# Patient Record
Sex: MALE | URBAN - METROPOLITAN AREA
[De-identification: names, ages, dates, MRNs, and addresses within clinical notes are randomized per-mention and may not be internally consistent; named-entity substitution may affect disease eponyms.]

---

## 2018-09-26 ENCOUNTER — PREPPED CHART (OUTPATIENT)
Dept: URBAN - METROPOLITAN AREA CLINIC 26 | Facility: CLINIC | Age: 58
End: 2018-09-26

## 2020-03-22 PROBLEM — M1A.0790 CHRONIC IDIOPATHIC GOUT INVOLVING TOE: Status: ACTIVE | Noted: 2020-03-22

## 2020-08-14 ENCOUNTER — PATIENT OUTREACH (OUTPATIENT)
Dept: OTHER | Facility: OTHER | Age: 60
End: 2020-08-14

## 2020-08-14 NOTE — LETTER
August 14, 2020     Eric Ledesma  [address] [facility] [address]       Dear Mr. Ledesma,    Welcome to [facility]! Our goal is to make care effective, proactive and convenient by using data you send us from home to better treat your chronic conditions.              My name is Tala Alejo, and I am your dedicated Digital Medicine clinician. As an expert in medication management, I will help ensure that the medications you are taking continue to provide the intended benefits and help you reach your goals. You can reach me directly at [phone] or by sending me a message directly through your [facility] account.      I am Eric Myles and I will be your health . My job is to help you identify lifestyle changes to improve your disease control. We will talk about nutrition, exercise, and other ways you may be able to adjust your current habits to better your health. Additionally, we will help ensure you are completing the tests and screenings that are necessary to help manage your conditions. You can reach me directly at [phone] or by sending me a message directly through your [facility] account.    Most importantly, YOU are at the center of this team. Together, we will work to improve your overall health and encourage you to meet your goals for a healthier lifestyle.     What we expect from YOU:  · Please take frequent home blood pressure measurements. We ask that you take at least 1 blood pressure reading per week, but more information will better help us get you know you. Be sure you rest for a few minutes before taking the reading in a quiet, comfortable place.     Be available to receive phone calls or [employer] messages, when appropriate, from your care team. Please let us know if there are any specific days or times that work best for us to reach you via phone.     Complete routine tests and screenings. Dont worry, we will help keep you on track!           What  you should expect from your Digital Medicine Care Team:   We will work with you to create a personalized plan of care and provide you with encouragement and education, including regarding lifestyle changes, that could help you manage your disease states.     We will adjust your current medications, if needed, and continue to monitor your long-term progress.     We will provide you and your physician with monthly progress reports after you have been in the program for more than 30 days.     We will send you reminders through [employer] and text messages to help ensure you do not miss any testing deadlines to help manage your disease states.    You will be able to reach us by phone or through your [facility] account by clicking our names under Care Team on the right side of the home screen.    I look forward to working with you to achieve your blood pressure goals!    We look forward to working with you to help manage your health,    Sincerely,    Your Digital Medicine Team    Please visit our websites to learn more:   · Hypertension: [email or web address]      Remember, we are not available for emergencies. If you have an emergency, please contact your doctors office directly or call [facility] ([phone] or [phone]) or [phone].          EMS

## 2020-08-14 NOTE — PROGRESS NOTES
Digital Medicine: Health  Introduction    Introduced Eric Ledesma to Digital Medicine. Discussed health  role and recommended lifestyle modifications.    The history is provided by the patient.           Additional Enrollment Details: Patient explained that he also uses another cuff to monitor from home and that the other cuff runs about 20 points less than the digital medicine cuff. We reviewed technique and he agreed to see if there is any improvement in the descrepency over the next 4-7 weeks.     He has been taking his readings before taking his blood pressure meds and from a couch - we discussed proper timing and technique.    HYPERTENSION  Explained hypertension digital medicine goals including BP goal less than or equal to 130/80mmHg, improved convenience of BP management and reduced risk of heart attack, kidney failure, stroke, eye disease, dementia, and death.      Explained that we expect patient to submit several blood pressure readings per week at random times of the day, but at least 30 minutes after taking blood pressure medications. Instructed patient not to allow anyone else to use their blood pressure monitor and phone as data submitted is directly entered into medical record. Reviewed and confirmed appropriate blood pressure monitoring technique.         Patient's BP goal is less than or equal to 130/80.Patient's BP average is 134/83 mmHg, which is above goal, per 2017 ACC/AHA Hypertension Guidelines.    Explained to the patient that the Digital Medicine team is not available for emergencies. Advised patient call [facility] ([phone] or [phone]) or [phone] if needed.               Diet-Not assessed          Physical Activity-Not assessed    Medication Adherence-Medication Adherence not addressed.      Substance, Sleep, Stress-Not assessed      Additional monitoring needed.  Reviewed Device Techniques.     Patient verbalizes understanding.      Sent link to [facility] webpages and my contact information via [employer] for future questions.        Explained to the patient that the Digital Medicine team is not available for emergencies. Advised patient call [facility] ([phone] or [phone]) or [phone] if needed.           Topic    Lipid (Cholesterol) Test        Last 5 Patient Entered Readings                                      Current 30 Day Average: 134/83     Recent Readings 8/14/2020 8/13/2020 8/12/2020 8/12/2020 8/11/2020    SBP (mmHg) 131 150 110 151 134    DBP (mmHg) 81 90 70 75 85    Pulse 77 79 87 56 82

## 2020-08-17 ENCOUNTER — PATIENT OUTREACH (OUTPATIENT)
Dept: OTHER | Facility: OTHER | Age: 60
End: 2020-08-17

## 2020-08-17 NOTE — PROGRESS NOTES
Digital Medicine: Clinician Introduction    Eric Ledesma is a 59 y.o. male who is newly enrolled in the Digital Medicine Clinic.    The history is provided by the patient.      Review of patient's allergies indicates:  No Known Allergies  Completed Medication Reconciliation  Verified pharmacy information.    HYPERTENSION  Explained hypertension digital medicine goals including BP goal less than or equal to 130/80mmHg, improved convenience of BP management and reduced risk of heart attack, kidney failure, stroke, eye disease, dementia, and death.     Explained non-pharmacologic therapies like low salt diet and physical activity can reduce blood pressure.       Explained that we expect patient to submit several blood pressure readings per week at random times of the day, but at least 30 minutes after taking blood pressure medications. Instructed patient not to allow anyone else to use their blood pressure monitor and phone as data submitted is directly entered into medical record. Reviewed and confirmed appropriate blood pressure monitoring technique.         Reviewed signs/symptoms of hypertension (headache, changes in vision, chest pain, shortness of breath)   Reviewed signs/symptoms of hypotension (lightheaded, dizziness, weakness)         Last 5 Patient Entered Readings                                      Current 30 Day Average: 133/82     Recent Readings 8/17/2020 8/16/2020 8/15/2020 8/14/2020 8/13/2020    SBP (mmHg) 139 123 131 131 150    DBP (mmHg) 81 75 76 81 90    Pulse 78 75 77 77 79          Depression Screening  Eric Ledesma did not answer the depression screening.     Sleep Apnea Screening  Patient not previously diagnosed with JESSICA       Medication Affordability Screening  Patient screened negative on the medication affordability questionnaires. Patient is currently not having problems affording medications    Medication Adherence-Medication adherence was assessed.  Patient continue taking  medication as prescribed.            ASSESSMENT(S)  Patients BP average is 133/82 mmHg, which is above goal. Patient's BP goal is less than or equal to 130/80 per 2017 ACC/AHA Hypertension Guidelines.     Patient currently taking amlodipine 10 mg tab daily, which is appropriate first line HTN therapy for an  male. Recommend continuation of therapy and reassess in 2-3 months.      Hypertension Plan  Continue current therapy.       Addressed any questions or concerns and patient has my contact information if needed prior to next outreach. Patient verbalizes understanding.      Explained the importance of self-monitoring and medication adherence. Encouraged the patient to communicate with their health  for lifestyle modifications to help improve or maintain a healthy lifestyle.        Sent link to [facility] webpages and my contact information via [employer] for future questions.        Explained to the patient that the Digital Medicine team is not available for emergencies. Advised patient call [facility] ([phone] or [phone]) or [phone] if needed.             Topic    Lipid (Cholesterol) Test        Current Medication Regimen:  Hypertension Medications             amLODIPine (NORVASC) 10 MG tablet Take 1 tablet (10 mg total) by mouth once daily.

## 2020-10-20 ENCOUNTER — PATIENT OUTREACH (OUTPATIENT)
Dept: OTHER | Facility: OTHER | Age: 60
End: 2020-10-20

## 2020-10-20 NOTE — PROGRESS NOTES
Digital Medicine: Clinician Follow-Up    Patient indicates not taking BP medications prior to taking reading. Patient willing to start taking amlodipine 10 mg tab at night starting 10/21 as he has already taken today's dose.    Patient thought Dig Med Program was free and he received a bill in the mail. Declines being transferred to billing department. Indicates the mail he received has the phone number to call on it and he will complete task when he has time.    The history is provided by the patient.   Follow-up reason(s): routine follow up.     Hypertension    Readings are trending up   Patient is not experiencing signs/symptoms of hypotension.  Patient is not experiencing signs/symptoms of hypertension.            Last 5 Patient Entered Readings                                      Current 30 Day Average: 130/83     Recent Readings 10/20/2020 10/19/2020 10/18/2020 10/18/2020 10/17/2020    SBP (mmHg) 138 126 121 144 145    DBP (mmHg) 82 83 81 87 95    Pulse 73 75 65 76 80                 Depression Screening  Did not address depression screening.    Sleep Apnea Screening    Did not address sleep apnea screening.     Medication Affordability Screening  Did not address medication affordability screening.     Medication Adherence-Medication adherence was assessed.          ASSESSMENT(S)  Patients BP average is 130/83 mmHg, which is at goal. Patient's BP goal is less than or equal to 130/80.    Hypertension Plan  Continue current therapy.  Provided patient education. Informed patient to please keep me informed if he would like to remain in the program after speaking with billing department. Counseled patient if challenging to take BP med in the morning and wait an hr before checking BP, he is able to begin taking amlodipine at night starting 10/21.       Addressed patient questions and patient has my contact information if needed prior to next outreach. Patient verbalizes understanding.                 Topic     Lipid (Cholesterol) Test      There are no preventive care reminders to display for this patient.      Hypertension Medications             amLODIPine (NORVASC) 10 MG tablet Take 1 tablet (10 mg total) by mouth once daily.

## 2020-10-28 ENCOUNTER — PATIENT OUTREACH (OUTPATIENT)
Dept: OTHER | Facility: OTHER | Age: 60
End: 2020-10-28

## 2020-10-28 NOTE — PROGRESS NOTES
"Digital Medicine: Health  Follow-Up    The history is provided by the patient.             Reason for review: Blood pressure not at goal        Topics Covered on Call: Diet and med timing    Additional Follow-up details: Mr. Ledesma explained that he had called about the insurance bill that he received and was able to resolve it. He discussed that he still is interested in being enrolled in the program.                Diet-no change to diet    No change to diet.  Patient reports eating or drinking the following: Mr. Ledesma explained that he understands that diet can effect the blood pressure and that he does "the best he can".       Physical Activity-Not assessed    Medication Adherence-Medication adherence was asssessed.    Patient reported missing medication: Mr. Ledesma and I reviewed the proper timing of his blood pressure readings in relation to his medication, which he is working to change..      Substance, Sleep, Stress-Not assessed      Additional monitoring needed.       Addressed patient questions and patient has my contact information if needed prior to next outreach. Patient verbalizes understanding.      Explained the importance of self-monitoring and medication adherence. Encouraged the patient to communicate with their health  for lifestyle modifications to help improve or maintain a healthy lifestyle.                   Topic    Lipid (Cholesterol) Test          Last 5 Patient Entered Readings                                      Current 30 Day Average: 132/84     Recent Readings 10/28/2020 10/28/2020 10/27/2020 10/27/2020 10/27/2020    SBP (mmHg) 137 154 138 149 138    DBP (mmHg) 86 90 84 88 90    Pulse 74 75 72 74 76               "

## 2020-12-09 ENCOUNTER — PATIENT OUTREACH (OUTPATIENT)
Dept: OTHER | Facility: OTHER | Age: 60
End: 2020-12-09

## 2020-12-09 NOTE — PROGRESS NOTES
Digital Medicine: Health  Follow-Up    The history is provided by the patient.             Reason for review: Blood pressure not at goal      Additional Follow-up details: We checked in briefly as it sounded like he may have been working during our conversation.    He explained that he sometimes takes his blood pressure first thing in the morning, after waking, and notices that when he waits longer after it to retake it that his readings are lower. I requested that he wait for at least 45 minutes after waking in the morning to take his readings.    He discussed that he continues to receive bills indicating that digital medicine is billing his insurance. I explained that he is not obligated to pay those bills at this point.             Diet-Not assessed          Physical Activity-Not assessed    Medication Adherence-Medication adherence was asssessed.  Patient continue taking medication as prescribed.          He explained that he takes his blood pressure medicine at night  Substance, Sleep, Stress-Not assessed      Additional monitoring needed.  Reviewed Device Techniques.     Patient verbalizes understanding.      Explained the importance of self-monitoring and medication adherence. Encouraged the patient to communicate with their health  for lifestyle modifications to help improve or maintain a healthy lifestyle.                   Topic    Lipid (Cholesterol) Test     Hemoglobin A1C     Urine Protein Check          Last 5 Patient Entered Readings                                      Current 30 Day Average: 137/82     Recent Readings 12/9/2020 12/8/2020 12/7/2020 12/6/2020 12/5/2020    SBP (mmHg) 129 124 147 137 117    DBP (mmHg) 79 75 84 89 79    Pulse 75 63 71 69 69

## 2021-04-16 ENCOUNTER — PATIENT MESSAGE (OUTPATIENT)
Dept: OTHER | Facility: OTHER | Age: 61
End: 2021-04-16

## 2021-12-14 PROBLEM — F17.210 CIGARETTE SMOKER: Status: ACTIVE | Noted: 2021-12-14

## 2022-06-07 DIAGNOSIS — U07.1 COVID-19 VIRUS DETECTED: ICD-10-CM

## 2022-07-11 PROBLEM — M10.9 GOUT: Status: ACTIVE | Noted: 2022-07-11

## 2022-07-25 ENCOUNTER — CLINICAL SUPPORT (OUTPATIENT)
Dept: SMOKING CESSATION | Facility: CLINIC | Age: 62
End: 2022-07-25
Payer: COMMERCIAL

## 2022-07-25 DIAGNOSIS — F17.200 NICOTINE DEPENDENCE: Primary | ICD-10-CM

## 2022-07-25 PROCEDURE — 99404 PR PREVENT COUNSEL,INDIV,60 MIN: ICD-10-PCS | Mod: S$GLB,,, | Performed by: GENERAL PRACTICE

## 2022-07-25 PROCEDURE — 99404 PREV MED CNSL INDIV APPRX 60: CPT | Mod: S$GLB,,, | Performed by: GENERAL PRACTICE

## 2022-07-25 RX ORDER — DM/P-EPHED/ACETAMINOPH/DOXYLAM 30-7.5/3
2 LIQUID (ML) ORAL
Qty: 96 LOZENGE | Refills: 0 | Status: SHIPPED | OUTPATIENT
Start: 2022-07-25 | End: 2022-11-22 | Stop reason: SDUPTHER

## 2022-07-25 RX ORDER — IBUPROFEN 200 MG
1 TABLET ORAL DAILY
Qty: 14 PATCH | Refills: 0 | Status: SHIPPED | OUTPATIENT
Start: 2022-07-25 | End: 2022-11-22 | Stop reason: DRUGHIGH

## 2022-08-04 ENCOUNTER — CLINICAL SUPPORT (OUTPATIENT)
Dept: SMOKING CESSATION | Facility: CLINIC | Age: 62
End: 2022-08-04
Payer: COMMERCIAL

## 2022-08-04 DIAGNOSIS — F17.200 NICOTINE DEPENDENCE: Primary | ICD-10-CM

## 2022-08-04 PROCEDURE — 99402 PR PREVENT COUNSEL,INDIV,30 MIN: ICD-10-PCS | Mod: S$GLB,,, | Performed by: GENERAL PRACTICE

## 2022-08-04 PROCEDURE — 99402 PREV MED CNSL INDIV APPRX 30: CPT | Mod: S$GLB,,, | Performed by: GENERAL PRACTICE

## 2022-08-08 DIAGNOSIS — F17.200 NICOTINE DEPENDENCE: Primary | ICD-10-CM

## 2022-08-08 RX ORDER — DM/P-EPHED/ACETAMINOPH/DOXYLAM 30-7.5/3
2 LIQUID (ML) ORAL
Qty: 96 LOZENGE | Refills: 0 | Status: SHIPPED | OUTPATIENT
Start: 2022-08-08 | End: 2022-11-22 | Stop reason: SDUPTHER

## 2022-08-08 RX ORDER — NICOTINE 7MG/24HR
1 PATCH, TRANSDERMAL 24 HOURS TRANSDERMAL DAILY
Qty: 14 PATCH | Refills: 0 | Status: SHIPPED | OUTPATIENT
Start: 2022-08-08 | End: 2022-11-22 | Stop reason: DRUGHIGH

## 2022-08-09 NOTE — PROGRESS NOTES
Individual Follow-Up Form    8/9/2022    Quit Date: TBD    Clinical Status of Patient: Outpatient    Length of Service: 30 minutes    Continuing Medication: yes  Patches    Other Medications: lozenges     Target Symptoms: Withdrawal and medication side effects. The following were  rated moderate (3) to severe (4) on TCRS:  · Moderate (3): none  · Severe (4): none    Comments: Followed up with patient by phone. Patient is down to 3-4 cigarettes per day.  rientation, client introductions, completion of TCRS (Tobacco Cessation Rating Scale) learned addiction model, cues/triggers, personal reasons for quitting, medications, goals, quit date.The patient will continue with  therapy sessions and medication monitoring by CTTS. Prescribed medication management will be by physician.The patient denies any abnormal behavioral or mental changes at this time.       Diagnosis: F17.210    Next Visit: 2 weeks

## 2022-11-17 ENCOUNTER — CLINICAL SUPPORT (OUTPATIENT)
Dept: SMOKING CESSATION | Facility: CLINIC | Age: 62
End: 2022-11-17
Payer: COMMERCIAL

## 2022-11-17 DIAGNOSIS — F17.200 NICOTINE DEPENDENCE: Primary | ICD-10-CM

## 2022-11-17 PROCEDURE — 99999 PR PBB SHADOW E&M-EST. PATIENT-LVL I: ICD-10-PCS | Mod: PBBFAC,,,

## 2022-11-17 PROCEDURE — 99999 PR PBB SHADOW E&M-EST. PATIENT-LVL I: CPT | Mod: PBBFAC,,,

## 2022-11-17 PROCEDURE — 99407 PR TOBACCO USE CESSATION INTENSIVE >10 MINUTES: ICD-10-PCS | Mod: S$GLB,,,

## 2022-11-17 PROCEDURE — 99407 BEHAV CHNG SMOKING > 10 MIN: CPT | Mod: S$GLB,,,

## 2022-11-17 NOTE — PROGRESS NOTES
Spoke with patient today in regard to smoking cessation progress for 3 month telephone follow up, he states not tobacco free. Patient has scheduled an appointment to return to the program for Quit #2 on 11/22/2022 @ 9:15 am virtually. Informed patient of benefit period, future follow ups, and contact information if any further help or support is needed. Will resolve episode and complete smart form for Quit attempt #1.

## 2022-11-22 ENCOUNTER — CLINICAL SUPPORT (OUTPATIENT)
Dept: SMOKING CESSATION | Facility: CLINIC | Age: 62
End: 2022-11-22
Payer: COMMERCIAL

## 2022-11-22 ENCOUNTER — TELEPHONE (OUTPATIENT)
Dept: SMOKING CESSATION | Facility: CLINIC | Age: 62
End: 2022-11-22
Payer: MEDICAID

## 2022-11-22 DIAGNOSIS — F17.200 NICOTINE DEPENDENCE: Primary | ICD-10-CM

## 2022-11-22 PROCEDURE — 99404 PR PREVENT COUNSEL,INDIV,60 MIN: ICD-10-PCS | Mod: 95,,,

## 2022-11-22 PROCEDURE — 99404 PREV MED CNSL INDIV APPRX 60: CPT | Mod: 95,,,

## 2022-11-22 RX ORDER — IBUPROFEN 200 MG
1 TABLET ORAL DAILY
Qty: 28 PATCH | Refills: 0 | Status: SHIPPED | OUTPATIENT
Start: 2022-11-22 | End: 2023-01-24

## 2022-11-22 RX ORDER — DM/P-EPHED/ACETAMINOPH/DOXYLAM 30-7.5/3
2 LIQUID (ML) ORAL
Qty: 144 LOZENGE | Refills: 0 | Status: SHIPPED | OUTPATIENT
Start: 2022-11-22 | End: 2023-01-24

## 2022-11-22 NOTE — TELEPHONE ENCOUNTER
Patient was contacted after not checking in for today's virtual visit.  Patient stated he was having difficulties and that he also had an eye appointment at 10am that he needed to go to.  Patient stated he would call me back later today to reschedule his appointment.

## 2022-11-22 NOTE — PROGRESS NOTES
FTND score of 5 indicates a high dependence to Nicotine.  Patient states he is smoking about 10 cpd.  NAV-D score of 13 is perceived as no mental distress or depression at this time.  NRT ordered and behavioral changes discussed including smoking all cigarettes outside.

## 2022-11-29 ENCOUNTER — CLINICAL SUPPORT (OUTPATIENT)
Dept: SMOKING CESSATION | Facility: CLINIC | Age: 62
End: 2022-11-29
Payer: COMMERCIAL

## 2022-11-29 DIAGNOSIS — F17.200 NICOTINE DEPENDENCE: Primary | ICD-10-CM

## 2022-11-29 PROCEDURE — 99403 PREV MED CNSL INDIV APPRX 45: CPT | Mod: 95,,,

## 2022-11-29 PROCEDURE — 99403 PR PREVENT COUNSEL,INDIV,45 MIN: ICD-10-PCS | Mod: 95,,,

## 2022-11-29 NOTE — PROGRESS NOTES
Individual Follow-Up Form    11/29/2022    Quit Date: N/A    Clinical Status of Patient: Outpatient    Length of Service: 45 minutes    Continuing Medication: yes  Patches or Nicotine Lozenges    Other Medications:      Target Symptoms: Withdrawal and medication side effects. The following were  rated moderate (3) to severe (4) on TCRS:  Moderate (3): none  Severe (4): none    Comments: completion of TCRS (Tobacco Cessation Rating Scale) reviewed strategies, cues, and triggers. Introduced the negative impact of tobacco on health, the health advantages of discontinuing the use of tobacco, time line improved health changes after a quit, withdrawal issues to expect from nicotine and habit, and ways to achieve the goal of a quit.  Patient states he has reduced to 4 cpd, but is smoking half cigarettes 8 times a day.  We discussed the 7-10 second blood brain/barrier with smoking and Nicotine.  We discussed a reduction to lighting a cigarette no more than 6 times a day starting tomorrow.  Patient did not start NRT until 11/28/23.  We discussed the 15 minute rule from thought to action of smoking.  We discussed healthy distraction and going for walks when he has an urge to smoke.  We discussed increasing NRT lozenge use for urges and the importance of smoking all cigarettes outside.  Patient remains on prescribed tobacco cessation medication regimen of 21 mg patch without any negative side effects at this time.The patient will continue with virtual therapy sessions and medication monitoring by CTTS. Prescribed medication management will be by physician.     Diagnosis: F17.200    Next Visit: 1 week

## 2022-12-06 ENCOUNTER — CLINICAL SUPPORT (OUTPATIENT)
Dept: SMOKING CESSATION | Facility: CLINIC | Age: 62
End: 2022-12-06
Payer: COMMERCIAL

## 2022-12-06 DIAGNOSIS — F17.200 NICOTINE DEPENDENCE: Primary | ICD-10-CM

## 2022-12-06 PROCEDURE — 99403 PREV MED CNSL INDIV APPRX 45: CPT | Mod: 95,,,

## 2022-12-06 PROCEDURE — 99403 PR PREVENT COUNSEL,INDIV,45 MIN: ICD-10-PCS | Mod: 95,,,

## 2022-12-06 NOTE — PROGRESS NOTES
Individual Follow-Up Form    12/6/2022    Quit Date:     Clinical Status of Patient: Outpatient    Length of Service: 45 minutes    Continuing Medication: yes  Patches or Nicotine Lozenges    Other Medications:      Target Symptoms: Withdrawal and medication side effects. The following were  rated moderate (3) to severe (4) on TCRS:  Moderate (3): none  Severe (4): none    Comments: completion of TCRS (Tobacco Cessation Rating Scale) reviewed strategies, controlling environment, cues, triggers, new goals set. Introduced high risk situations with preparation interventions, caffeine similarities with withdrawal issues of habit and nicotine, Alcohol, Understanding urges, cravings, stress and relaxation. Open discussion with intervention discussion.  Today's visit was conducted via phone due to patient inability to connect to his video.  Patient will attempt to contact help line next week.  Patient states has bee smoking 3 cpd and relighting each twice.  Patient states he has only smoked 2 cigarettes today.  I congratulated him for his rate reduction.  We discussed a Quit this week by his birthday.  Patient states that he feels the lozenges and patches are helping with urges and they are not strong.  Patient states he has been talking with his son which helps with urges.  We discussed the [employer] stop smoking mona to help with tracking money saved and cigarettes not smoked.  Patient is confident he will be able to quit smoking.  Patient remains on prescribed tobacco cessation medication regimen of 21 mg patch without any negative side effects at this time. The patient will continue with virtual therapy sessions and medication monitoring by CTTS. Prescribed medication management will be by physician.     Diagnosis: F17.200    Next Visit: 1 week

## 2022-12-13 ENCOUNTER — TELEPHONE (OUTPATIENT)
Dept: SMOKING CESSATION | Facility: CLINIC | Age: 62
End: 2022-12-13
Payer: MEDICAID

## 2022-12-13 NOTE — TELEPHONE ENCOUNTER
Patient contacted to cancel today's visit due to CTTS influenza.  Contact made, appointment scheduled for next week.

## 2022-12-20 ENCOUNTER — CLINICAL SUPPORT (OUTPATIENT)
Dept: SMOKING CESSATION | Facility: CLINIC | Age: 62
End: 2022-12-20
Payer: COMMERCIAL

## 2022-12-20 DIAGNOSIS — F17.200 NICOTINE DEPENDENCE: Primary | ICD-10-CM

## 2022-12-20 PROCEDURE — 99402 PR PREVENT COUNSEL,INDIV,30 MIN: ICD-10-PCS | Mod: 95,,,

## 2022-12-20 PROCEDURE — 99402 PREV MED CNSL INDIV APPRX 30: CPT | Mod: 95,,,

## 2022-12-20 NOTE — PROGRESS NOTES
Individual Follow-Up Form    12/20/2022    Quit Date: 12/11/22    Clinical Status of Patient: Outpatient    Length of Service: 30 minutes    Continuing Medication: no    Other Medications:      Target Symptoms: Withdrawal and medication side effects. The following were  rated moderate (3) to severe (4) on TCRS:  Moderate (3): none  Severe (4): none    Comments: completion of TCRS (Tobacco Cessation Rating Scale) reviewed strategies, habitual behavior, high risks situations, understanding urges and cravings, stress and relaxation with open discussion and additional interventions, Introduced lapses, relapses, understanding them and analyzing the situation of a lapse, conflict issues that may be linked to a lapse.  Today's appointment conducted via phone due to patient 's inability to connect to virtual camera.  Patient states that he has been cigarette free since 12/11/22.  I congratulated the patient for his achievement and continued success.  Patient states that he discontinued NRT 3-4 days ago.  We discussed the benefits of being Nicotine free.   We discussed having a plan for a bad day with strong urges or cravings.  Patient states his sense of smell has improved and that he now dislikes the strong smell of smoke on others that he socializes with.  Patient states he feels healthier since quitting and we discussed long term benefits of quitting.  Patient states he has benefited from the support and education he has received from our program and is confident he will remain a non-smoker.  The patient will continue with virtual therapy sessions and medication monitoring by CTTS. Prescribed medication management will be by physician.     Diagnosis: F17.200    Next Visit: 2 weeks

## 2023-01-03 ENCOUNTER — CLINICAL SUPPORT (OUTPATIENT)
Dept: SMOKING CESSATION | Facility: CLINIC | Age: 63
End: 2023-01-03
Payer: COMMERCIAL

## 2023-01-03 DIAGNOSIS — F17.200 NICOTINE DEPENDENCE: Primary | ICD-10-CM

## 2023-01-03 PROCEDURE — 99402 PREV MED CNSL INDIV APPRX 30: CPT | Mod: 95,,,

## 2023-01-03 PROCEDURE — 99402 PR PREVENT COUNSEL,INDIV,30 MIN: ICD-10-PCS | Mod: 95,,,

## 2023-01-03 NOTE — PROGRESS NOTES
Individual Follow-Up Form    1/3/2023    Quit Date: 12/11/22    Clinical Status of Patient: Outpatient    Length of Service: 30 minutes    Continuing Medication: no    Other Medications:      Target Symptoms: Withdrawal and medication side effects. The following were  rated moderate (3) to severe (4) on TCRS:  Moderate (3):   Severe (4):     Comments: completion of TCRS (Tobacco Cessation Rating Scale) reviewed strategies, habitual behavior, high risks situations, understanding urges and cravings, stress and relaxation with open discussion and additional interventions, Introduced lapses, relapses, understanding them and analyzing the situation of a lapse, conflict issues that may be linked to a lapse.  Patient unable to connect to virtual video and appointment was conducted via phone. Patient states that he remains tobacco free.  I congratulated the patient for his continued success.  Patient states that when he is alone, he still has an occasional urge to smoke.  Patient states that he stays busy and it passes.  Patient states he does not have urges when he consumes alcohol or is driving for work.  Patient continues to dislike the smell of smoke on smokers and is confident he will remain tobacco free.  The patient will continue with virtual therapy sessions and medication monitoring by CTTS. Prescribed medication management will be by physician.      Diagnosis: F17.200    Next Visit: 2 weeks

## 2023-01-05 PROBLEM — Z87.891 EX-SMOKER: Status: ACTIVE | Noted: 2023-01-05

## 2023-01-05 PROBLEM — K08.9 TEETH PROBLEM: Status: ACTIVE | Noted: 2023-01-05

## 2023-01-05 PROBLEM — F17.210 CIGARETTE SMOKER: Status: RESOLVED | Noted: 2021-12-14 | Resolved: 2023-01-05

## 2023-01-24 ENCOUNTER — CLINICAL SUPPORT (OUTPATIENT)
Dept: SMOKING CESSATION | Facility: CLINIC | Age: 63
End: 2023-01-24
Payer: COMMERCIAL

## 2023-01-24 DIAGNOSIS — F17.200 NICOTINE DEPENDENCE: Primary | ICD-10-CM

## 2023-01-24 PROCEDURE — 99401 PR PREVENT COUNSEL,INDIV,15 MIN: ICD-10-PCS | Mod: 95,,,

## 2023-01-24 PROCEDURE — 99401 PREV MED CNSL INDIV APPRX 15: CPT | Mod: 95,,,

## 2023-01-24 NOTE — PROGRESS NOTES
Individual Follow-Up Form    1/24/2023    Quit Date: 12/11/22    Clinical Status of Patient: Outpatient    Length of Service: 15 minutes    Continuing Medication: no    Other Medications:      Target Symptoms: Withdrawal and medication side effects. The following were  rated moderate (3) to severe (4) on TCRS:  Moderate (3): none  Severe (4): none    Comments: completion of TCRS (Tobacco Cessation Rating Scale) reviewed strategies, cues, triggers, high risk situations, lapses, relapses, diet, exercise, stress, relaxation, sleep, habitual behavior, and life style changes.  Patient states that he remains tobacco free. I congratulated him for his achievement and continued success.  Patient states he still has an occasional urge in the morning when reading, but they are manageable.  Patient is confident he will remain tobacco free.  Patient has my contact information and was instructed to contact me in the future if any further help or support was needed.  Patient has completed our program.     Diagnosis: F17.200    Next Visit:

## 2023-02-22 ENCOUNTER — CLINICAL SUPPORT (OUTPATIENT)
Dept: SMOKING CESSATION | Facility: CLINIC | Age: 63
End: 2023-02-22
Payer: COMMERCIAL

## 2023-02-22 DIAGNOSIS — F17.200 NICOTINE DEPENDENCE: Primary | ICD-10-CM

## 2023-02-22 PROCEDURE — 99407 BEHAV CHNG SMOKING > 10 MIN: CPT | Mod: S$GLB,,,

## 2023-02-22 PROCEDURE — 99999 PR PBB SHADOW E&M-EST. PATIENT-LVL I: CPT | Mod: PBBFAC,,,

## 2023-02-22 PROCEDURE — 99407 PR TOBACCO USE CESSATION INTENSIVE >10 MINUTES: ICD-10-PCS | Mod: S$GLB,,,

## 2023-02-22 PROCEDURE — 99999 PR PBB SHADOW E&M-EST. PATIENT-LVL I: ICD-10-PCS | Mod: PBBFAC,,,

## 2023-02-22 NOTE — PROGRESS NOTES
Called pt to f/u on his 3 month smoking cessation quit status. Pt stated he remains tobacco free for 2 months. Pt has completed program. Congratulated him on his hard work and success. Informed him of benefit period, phone follow ups, and contact information. Will complete smart form and will continue to follow up on quit #2 episode.

## 2023-03-20 PROBLEM — H02.883 MEIBOMIAN GLAND DYSFUNCTION (MGD) OF BOTH EYES: Status: ACTIVE | Noted: 2023-03-20

## 2023-03-20 PROBLEM — H02.886 MEIBOMIAN GLAND DYSFUNCTION (MGD) OF BOTH EYES: Status: ACTIVE | Noted: 2023-03-20

## 2023-03-20 PROBLEM — H25.813 COMBINED FORM OF AGE-RELATED CATARACT, BOTH EYES: Status: ACTIVE | Noted: 2023-03-20

## 2023-03-24 PROBLEM — H25.811 COMBINED FORMS OF AGE-RELATED CATARACT OF RIGHT EYE: Status: ACTIVE | Noted: 2023-03-20

## 2023-03-31 PROBLEM — Z98.890 S/P EYE SURGERY: Status: ACTIVE | Noted: 2023-03-31

## 2023-10-26 PROBLEM — Z87.891 PERSONAL HISTORY OF TOBACCO USE, PRESENTING HAZARDS TO HEALTH: Status: ACTIVE | Noted: 2023-10-26

## 2023-11-15 ENCOUNTER — CLINICAL SUPPORT (OUTPATIENT)
Dept: SMOKING CESSATION | Facility: CLINIC | Age: 63
End: 2023-11-15
Payer: COMMERCIAL

## 2023-11-15 DIAGNOSIS — F17.200 NICOTINE DEPENDENCE: Primary | ICD-10-CM

## 2023-11-15 PROCEDURE — 99407 BEHAV CHNG SMOKING > 10 MIN: CPT | Mod: S$GLB,,, | Performed by: DIETITIAN, REGISTERED

## 2023-11-15 PROCEDURE — 99999 PR PBB SHADOW E&M-EST. PATIENT-LVL I: ICD-10-PCS | Mod: PBBFAC,,, | Performed by: DIETITIAN, REGISTERED

## 2023-11-15 PROCEDURE — 99407 PR TOBACCO USE CESSATION INTENSIVE >10 MINUTES: ICD-10-PCS | Mod: S$GLB,,, | Performed by: DIETITIAN, REGISTERED

## 2023-11-15 PROCEDURE — 99999 PR PBB SHADOW E&M-EST. PATIENT-LVL I: CPT | Mod: PBBFAC,,, | Performed by: DIETITIAN, REGISTERED

## 2023-11-15 NOTE — PROGRESS NOTES
Spoke with patient today in regard to smoking cessation progress for 12 month telephone follow up, he states he is tobacco free since December of 2022 (11 months now). Pt states he is very grateful to program and counselor for all the support. Commended patient on the accomplishment thus far. Informed patient of benefit period and contact information if any further help or support is needed. Will resolve episode and complete smart form for Quit attempt #2.

## 2024-04-09 ENCOUNTER — PATIENT OUTREACH (OUTPATIENT)
Dept: ADMINISTRATIVE | Facility: HOSPITAL | Age: 64
End: 2024-04-09
Payer: COMMERCIAL

## 2024-08-01 ENCOUNTER — PATIENT OUTREACH (OUTPATIENT)
Dept: ADMINISTRATIVE | Facility: HOSPITAL | Age: 64
End: 2024-08-01
Payer: COMMERCIAL

## 2024-11-18 ENCOUNTER — PATIENT MESSAGE (OUTPATIENT)
Dept: ADMINISTRATIVE | Facility: HOSPITAL | Age: 64
End: 2024-11-18
Payer: COMMERCIAL

## 2025-06-27 ENCOUNTER — TELEPHONE (OUTPATIENT)
Dept: ADMINISTRATIVE | Facility: HOSPITAL | Age: 65
End: 2025-06-27
Payer: COMMERCIAL

## 2025-06-27 ENCOUNTER — PATIENT OUTREACH (OUTPATIENT)
Dept: ADMINISTRATIVE | Facility: HOSPITAL | Age: 65
End: 2025-06-27
Payer: COMMERCIAL

## 2025-09-05 ENCOUNTER — PATIENT OUTREACH (OUTPATIENT)
Dept: ADMINISTRATIVE | Facility: HOSPITAL | Age: 65
End: 2025-09-05
Payer: COMMERCIAL